# Patient Record
Sex: FEMALE | Race: BLACK OR AFRICAN AMERICAN | NOT HISPANIC OR LATINO | Employment: FULL TIME | URBAN - METROPOLITAN AREA
[De-identification: names, ages, dates, MRNs, and addresses within clinical notes are randomized per-mention and may not be internally consistent; named-entity substitution may affect disease eponyms.]

---

## 2023-04-05 ENCOUNTER — ANCILLARY PROCEDURE (OUTPATIENT)
Dept: GENERAL RADIOLOGY | Facility: CLINIC | Age: 38
End: 2023-04-05
Attending: INTERNAL MEDICINE

## 2023-04-05 DIAGNOSIS — R76.12 POSITIVE QUANTIFERON-TB GOLD TEST: ICD-10-CM

## 2023-04-05 PROCEDURE — 99207 XR CHEST 1 VIEW, EMPLOYEE HEALTH: CPT | Mod: GC | Performed by: RADIOLOGY

## 2023-06-29 ENCOUNTER — HOSPITAL ENCOUNTER (EMERGENCY)
Facility: CLINIC | Age: 38
Discharge: HOME | End: 2023-06-29
Attending: EMERGENCY MEDICINE | Admitting: EMERGENCY MEDICINE
Payer: MEDICAID

## 2023-06-29 VITALS
SYSTOLIC BLOOD PRESSURE: 120 MMHG | DIASTOLIC BLOOD PRESSURE: 70 MMHG | TEMPERATURE: 97.3 F | BODY MASS INDEX: 37.56 KG/M2 | OXYGEN SATURATION: 100 % | HEART RATE: 62 BPM | HEIGHT: 63 IN | RESPIRATION RATE: 18 BRPM | WEIGHT: 212 LBS

## 2023-06-29 DIAGNOSIS — M54.16 LUMBAR BACK PAIN WITH RADICULOPATHY AFFECTING RIGHT LOWER EXTREMITY: ICD-10-CM

## 2023-06-29 PROCEDURE — 250N000012 HC RX MED GY IP 250 OP 636 PS 637: Performed by: EMERGENCY MEDICINE

## 2023-06-29 PROCEDURE — 99284 EMERGENCY DEPT VISIT MOD MDM: CPT

## 2023-06-29 PROCEDURE — 250N000013 HC RX MED GY IP 250 OP 250 PS 637: Performed by: EMERGENCY MEDICINE

## 2023-06-29 RX ORDER — LIDOCAINE 4 G/G
1 PATCH TOPICAL ONCE
Status: DISCONTINUED | OUTPATIENT
Start: 2023-06-29 | End: 2023-06-30 | Stop reason: HOSPADM

## 2023-06-29 RX ORDER — LIDOCAINE 50 MG/G
1 PATCH TOPICAL EVERY 24 HOURS
Qty: 10 PATCH | Refills: 0 | Status: SHIPPED | OUTPATIENT
Start: 2023-06-29

## 2023-06-29 RX ORDER — DEXAMETHASONE 4 MG/1
8 TABLET ORAL ONCE
Status: COMPLETED | OUTPATIENT
Start: 2023-06-29 | End: 2023-06-29

## 2023-06-29 RX ORDER — CYCLOBENZAPRINE HCL 10 MG
5-10 TABLET ORAL 3 TIMES DAILY PRN
Qty: 20 TABLET | Refills: 0 | Status: SHIPPED | OUTPATIENT
Start: 2023-06-29

## 2023-06-29 RX ORDER — IBUPROFEN 600 MG/1
600 TABLET, FILM COATED ORAL ONCE
Status: COMPLETED | OUTPATIENT
Start: 2023-06-29 | End: 2023-06-29

## 2023-06-29 RX ORDER — ACETAMINOPHEN 500 MG
1000 TABLET ORAL ONCE
Status: COMPLETED | OUTPATIENT
Start: 2023-06-29 | End: 2023-06-29

## 2023-06-29 RX ORDER — METHYLPREDNISOLONE 4 MG
TABLET, DOSE PACK ORAL
Qty: 21 TABLET | Refills: 0 | Status: SHIPPED | OUTPATIENT
Start: 2023-06-29

## 2023-06-29 RX ORDER — CYCLOBENZAPRINE HCL 10 MG
10 TABLET ORAL ONCE
Status: COMPLETED | OUTPATIENT
Start: 2023-06-29 | End: 2023-06-29

## 2023-06-29 RX ADMIN — ACETAMINOPHEN 1000 MG: 500 TABLET ORAL at 21:49

## 2023-06-29 RX ADMIN — CYCLOBENZAPRINE 10 MG: 10 TABLET, FILM COATED ORAL at 22:49

## 2023-06-29 RX ADMIN — DEXAMETHASONE 8 MG: 4 TABLET ORAL at 22:49

## 2023-06-29 RX ADMIN — IBUPROFEN 600 MG: 600 TABLET ORAL at 21:49

## 2023-06-29 RX ADMIN — LIDOCAINE 1 PATCH: 560 PATCH PERCUTANEOUS; TOPICAL; TRANSDERMAL at 22:49

## 2023-06-29 ASSESSMENT — ACTIVITIES OF DAILY LIVING (ADL): ADLS_ACUITY_SCORE: 33

## 2023-06-30 NOTE — ED PROVIDER NOTES
"    History     Chief Complaint:  Back Pain       HPI   Carolyn Beauchamp is a 37 year old female with past medical history of back pain who presents emergency department with exacerbation of her back pain with radiculopathy into her right leg.  Patient ports yesterday she began having some twinges of right lower back pain but today at work she began having some shooting pain going into her right thigh.  She locates her pain in her right lower lumbar musculature.  She notes that the shooting pain goes down into her thigh but stops at her knee.  She denies any weakness or numbness in her lower extremities bilaterally.  Denies any saddle anesthesia or urinary incontinence.  She works as a nurse upstairs.  She notes that she was moving some patients around and exacerbated her pain.  She notes that she has had similar pain like this before in the past but its been sometime.  She reports that muscle relaxers and physical therapy were helpful in the past.  She denies any bowel or bladder complaints.    Review of External Notes:  Reviewed physical therapy consultation note from 6/27/2019 when patient had acute lower back pain with bilateral sciatica.    Medications:    cyclobenzaprine (FLEXERIL) 10 MG tablet  lidocaine (LIDODERM) 5 % patch  methylPREDNISolone (MEDROL DOSEPAK) 4 MG tablet therapy pack      Past Medical History:    No past medical history on file.    Past Surgical History:    No past surgical history on file.       Physical Exam     Patient Vitals for the past 24 hrs:   BP Temp Temp src Pulse Resp SpO2 Height Weight   06/29/23 2142 120/70 97.3  F (36.3  C) Temporal 62 18 100 % 1.6 m (5' 3\") 96.2 kg (212 lb)      Physical Exam  General: Patient is awake, alert and interactive when I enter the room. Appears uncomfortable  Head: The scalp, face, and head appear normal. Atraumatic.   Neck: Normal range of motion.   CV: Regular rate.   Resp: No respiratory distress.     MS: Normal tone. Joints grossly normal without " effusions. No asymmetric leg swelling, calf or thigh tenderness.    Reflexes at patella and achilles are normal.  Sensation is intact in the legs and pelvis including the lower sacral nerve roots.  They are tender in the right lower lumbar paraspinal musculature and right upper gluteal muscle area.  There is significant paraspinal muscle spasm.  There is no redness or edema about the back.  No midline spinal pain and no stepoffs. Detailed strength exam is performed in lower extremities, there is symmetrical strength in all myotomes tested both proximally and distally.   Skin: No rash or lesions noted. Normal capillary refill noted  Neuro: Speech is normal and fluent. Face is symmetric. Moving all extremities.   Psych:  Normal affect.  Appropriate interactions.    Emergency Department Course       Emergency Department Course & Assessments:  Interventions:  Medications   Lidocaine (LIDOCARE) 4 % Patch 1 patch (1 patch Transdermal $Patch/Med Applied 6/29/23 2249)     And   lidocaine patch in PLACE ( Transdermal Patch in Place 6/29/23 2251)   acetaminophen (TYLENOL) tablet 1,000 mg (1,000 mg Oral $Given 6/29/23 2149)   ibuprofen (ADVIL/MOTRIN) tablet 600 mg (600 mg Oral $Given 6/29/23 2149)   cyclobenzaprine (FLEXERIL) tablet 10 mg (10 mg Oral $Given 6/29/23 2249)   dexamethasone (DECADRON) tablet 8 mg (8 mg Oral $Given 6/29/23 2249)       Disposition:  The patient was discharged to home.     Impression & Plan      Medical Decision Making:  Carolyn Beauchamp is a 37 year old female who presents for evaluation of back pain and radicular symptoms. They have history of back pain in the past.  Her pain has improved with interventions in the emergency department. She did not sustain any trauma, therefore x-rays are not necessary due to the low likelihood of fracture or subluxation. Advanced imaging with CT/MRI is not indicated at this time, but may be indicated in the future if symptoms fail to resolve.  Nor is there any indication  for consultation with neurosurgery or orthopedic spinal surgeon.  There is no clinical evidence of cauda equina syndrome, discitis, spinal/epidural space hematoma or epidural abscess or other emergently worrisome etiology. The neurological exam is reassuring without any neurological findings that would necessitate advanced imaging. The patient was advised that radiculopathy often takes significant time to resolve, and that follow up with primary care, neurology and/or neurosurgery will be indicated if symptoms do not improve. She will be discharged with pain medications to use as directed.  No heavy lifting, bending or twisting. Return if increasing pain, muscular weakness, or bowel or bladder dysfunction.     Diagnosis:    ICD-10-CM    1. Lumbar back pain with radiculopathy affecting right lower extremity  M54.16 Physical Therapy Referral           Discharge Medications:  Discharge Medication List as of 6/29/2023 11:10 PM      START taking these medications    Details   cyclobenzaprine (FLEXERIL) 10 MG tablet Take 0.5-1 tablets (5-10 mg) by mouth 3 times daily as needed for muscle spasms, Disp-20 tablet, R-0, Local Print      lidocaine (LIDODERM) 5 % patch Place 1 patch onto the skin every 24 hours To prevent lidocaine toxicity, patient should be patch free for 12 hrs daily.Disp-10 patch, R-0Local Print      methylPREDNISolone (MEDROL DOSEPAK) 4 MG tablet therapy pack Follow Package Directions, Disp-21 tablet, R-0, Local Print              MD Alexis Craven Christopher Joseph, MD  06/29/23 5606

## 2023-06-30 NOTE — ED TRIAGE NOTES
Pt c/o of sharp lower back pain that is radiating down both legs with tingling pt denies any numbness . No loss of bladder or bowels.

## 2024-01-03 ENCOUNTER — LAB REQUISITION (OUTPATIENT)
Dept: LAB | Facility: CLINIC | Age: 39
End: 2024-01-03

## 2024-01-03 PROCEDURE — 36415 COLL VENOUS BLD VENIPUNCTURE: CPT | Performed by: FAMILY MEDICINE

## 2024-01-03 PROCEDURE — 86481 TB AG RESPONSE T-CELL SUSP: CPT | Performed by: FAMILY MEDICINE

## 2024-01-05 LAB
GAMMA INTERFERON BACKGROUND BLD IA-ACNC: 0.1 IU/ML
M TB IFN-G BLD-IMP: NEGATIVE
M TB IFN-G CD4+ BCKGRND COR BLD-ACNC: 9.9 IU/ML
MITOGEN IGNF BCKGRD COR BLD-ACNC: 0.08 IU/ML
MITOGEN IGNF BCKGRD COR BLD-ACNC: 0.18 IU/ML
QUANTIFERON MITOGEN: 10 IU/ML
QUANTIFERON NIL TUBE: 0.1 IU/ML
QUANTIFERON TB1 TUBE: 0.28 IU/ML
QUANTIFERON TB2 TUBE: 0.18